# Patient Record
Sex: MALE | Race: BLACK OR AFRICAN AMERICAN | NOT HISPANIC OR LATINO | Employment: OTHER | URBAN - METROPOLITAN AREA
[De-identification: names, ages, dates, MRNs, and addresses within clinical notes are randomized per-mention and may not be internally consistent; named-entity substitution may affect disease eponyms.]

---

## 2017-08-24 ENCOUNTER — HISTORICAL (OUTPATIENT)
Dept: ADMINISTRATIVE | Facility: HOSPITAL | Age: 37
End: 2017-08-24

## 2017-08-25 LAB — FINAL CULTURE: NORMAL

## 2023-12-17 ENCOUNTER — HOSPITAL ENCOUNTER (EMERGENCY)
Facility: HOSPITAL | Age: 43
Discharge: HOME | End: 2023-12-17
Attending: EMERGENCY MEDICINE

## 2023-12-17 VITALS
SYSTOLIC BLOOD PRESSURE: 159 MMHG | TEMPERATURE: 98 F | HEART RATE: 86 BPM | DIASTOLIC BLOOD PRESSURE: 107 MMHG | OXYGEN SATURATION: 96 % | RESPIRATION RATE: 20 BRPM

## 2023-12-17 DIAGNOSIS — H00.011 HORDEOLUM EXTERNUM OF RIGHT UPPER EYELID: Primary | ICD-10-CM

## 2023-12-17 PROCEDURE — 99283 EMERGENCY DEPT VISIT LOW MDM: CPT

## 2023-12-17 RX ORDER — ERYTHROMYCIN 5 MG/G
OINTMENT OPHTHALMIC EVERY 4 HOURS
Qty: 3.5 G | Refills: 0 | Status: SHIPPED | OUTPATIENT
Start: 2023-12-17 | End: 2023-12-24

## 2023-12-17 NOTE — ED PROVIDER NOTES
Encounter Date: 12/17/2023       History     Chief Complaint   Patient presents with    Eye Problem     Pt c/o stye in R eye noticed on Friday. No drainage vision change.      The patient is a 43 y.o. male who presents to the Emergency Department with a chief complaint of pain to right upper eyelid. Symptoms began 2 days ago and have been constant since onset. His pain is currently rated as a 5/10 in severity and described as aching with no radiation. Associated symptoms include nothing. Symptoms are aggravated with blinking and there are no alleviating factors. The patient denies fever, chills, or drainage.  He denies changes in his vision.  He reports taking nothing prior to arrival with no relief of symptoms. Patient states he has been applying warm compresses with no improvement of symptoms. No other reported symptoms at this time     The history is provided by the patient. No  was used.   Eye Pain   This is a new problem. The current episode started two days ago. The problem occurs constantly. The problem has been unchanged. The right eye is affected. There was no injury mechanism. The pain is at a severity of 4/10. There is No known exposure to pink eye. He Does not wear contacts. Pertinent negatives include no discharge and no photophobia. Treatments tried: warm compresses. The treatment provided no relief.     Review of patient's allergies indicates:  No Known Allergies  History reviewed. No pertinent past medical history.  History reviewed. No pertinent surgical history.  History reviewed. No pertinent family history.  Social History     Tobacco Use    Smoking status: Never    Smokeless tobacco: Never     Review of Systems   Eyes:  Positive for pain and visual disturbance. Negative for photophobia, discharge and itching.   All other systems reviewed and are negative.      Physical Exam     Initial Vitals [12/17/23 1244]   BP Pulse Resp Temp SpO2   (!) 159/107 86 20 98.1 °F (36.7 °C) 96 %       MAP       --         Physical Exam    Nursing note and vitals reviewed.  Constitutional: Vital signs are normal. He appears well-developed and well-nourished.   HENT:   Head: Normocephalic.   Right Ear: Hearing and tympanic membrane normal.   Left Ear: Hearing and tympanic membrane normal.   Mouth/Throat: Uvula is midline, oropharynx is clear and moist and mucous membranes are normal.   Eyes: Conjunctivae and EOM are normal. Pupils are equal, round, and reactive to light. Lids are everted and swept, no foreign bodies found. Right eye exhibits hordeolum. Right eye exhibits no chemosis, no discharge and no exudate. Left eye exhibits no chemosis, no discharge, no exudate and no hordeolum. No foreign body present in the left eye. Right conjunctiva is not injected. Right conjunctiva has no hemorrhage. Left conjunctiva is not injected. Left conjunctiva has no hemorrhage.   Cardiovascular:  Normal rate, regular rhythm, normal heart sounds and normal pulses.           Pulmonary/Chest: Effort normal and breath sounds normal.   Abdominal: Abdomen is soft. Bowel sounds are normal. There is no abdominal tenderness.   Musculoskeletal:      Cervical back: No spinous process tenderness or muscular tenderness.     Lymphadenopathy:     He has no cervical adenopathy.   Neurological: He is alert. GCS eye subscore is 4. GCS verbal subscore is 5. GCS motor subscore is 6.   Skin: Skin is warm, dry and intact. Capillary refill takes less than 2 seconds.         ED Course   Procedures  Labs Reviewed - No data to display       Imaging Results    None          Medications - No data to display  Medical Decision Making  The patient is a 43 y.o. male who presents to the Emergency Department with a chief complaint of pain to right upper eyelid. Symptoms began 2 days ago and have been constant since onset. His pain is currently rated as a 5/10 in severity and described as aching with no radiation. Associated symptoms include nothing.  Symptoms are aggravated with blinking and there are no alleviating factors. The patient denies fever, chills, or drainage. He reports taking nothing prior to arrival with no relief of symptoms. Patient states he has been applying warm compresses with no improvement of symptoms. No other reported symptoms at this time     Differential diagnoses include but are not limited to blepharitis, stye    Problems Addressed:  Hordeolum externum of right upper eyelid: acute illness or injury                                      Clinical Impression:  Final diagnoses:  [H00.011] Hordeolum externum of right upper eyelid (Primary)          ED Disposition Condition    Discharge Stable          ED Prescriptions       Medication Sig Dispense Start Date End Date Auth. Provider    erythromycin (ROMYCIN) ophthalmic ointment Place into the right eye every 4 (four) hours. for 7 days 3.5 g 12/17/2023 12/24/2023 Janett Humphries NP          Follow-up Information       Follow up With Specialties Details Why Contact Info    Please contact [phone] to establish care with a primary care provider  Schedule an appointment as soon as possible for a visit                Janett Humphries NP  12/17/23 0434